# Patient Record
Sex: FEMALE | Race: WHITE | NOT HISPANIC OR LATINO | Employment: PART TIME | URBAN - METROPOLITAN AREA
[De-identification: names, ages, dates, MRNs, and addresses within clinical notes are randomized per-mention and may not be internally consistent; named-entity substitution may affect disease eponyms.]

---

## 2023-09-18 ENCOUNTER — TELEMEDICINE (OUTPATIENT)
Dept: FAMILY MEDICINE CLINIC | Facility: TELEHEALTH | Age: 43
End: 2023-09-18

## 2023-09-18 DIAGNOSIS — R09.81 COUGH WITH CONGESTION OF PARANASAL SINUS: ICD-10-CM

## 2023-09-18 DIAGNOSIS — R05.8 COUGH WITH CONGESTION OF PARANASAL SINUS: ICD-10-CM

## 2023-09-18 DIAGNOSIS — H69.83 EUSTACHIAN TUBE DYSFUNCTION, BILATERAL: Primary | ICD-10-CM

## 2023-09-18 RX ORDER — FLUTICASONE PROPIONATE 50 MCG
2 SPRAY, SUSPENSION (ML) NASAL DAILY
Qty: 16 G | Refills: 0 | Status: SHIPPED | OUTPATIENT
Start: 2023-09-18 | End: 2023-10-18

## 2023-09-18 RX ORDER — BROMPHENIRAMINE MALEATE, PSEUDOEPHEDRINE HYDROCHLORIDE, AND DEXTROMETHORPHAN HYDROBROMIDE 2; 30; 10 MG/5ML; MG/5ML; MG/5ML
5 SYRUP ORAL 4 TIMES DAILY PRN
Qty: 118 ML | Refills: 0 | Status: SHIPPED | OUTPATIENT
Start: 2023-09-18 | End: 2023-09-28

## 2023-09-18 NOTE — PROGRESS NOTES
Chief Complaint   Patient presents with    Earache    Sinusitis    Cough       Video Visit Reason:   Free Text Description: Cough, congestion, sore throat, ear ache. Completed prescription for amoxicillin last week and symptoms came back. Symptoms first started over 4 weeks ago.  Subjective   Leda Alvarez is a 43 y.o. female.     History of Present Illness  Cough, congestion and sinus congestion for about a month after treatment for a sinus infection a couple of weeks ago at Urgent Care with antibiotic and oral steroid. Symptoms improved but ears are still hurting, sinuses are still with congestion, still having drainage and cough. She is using nasal saline, no fever, chills, wheezing or shortness of air.  Earache   There is pain in both ears. The current episode started 1 to 4 weeks ago. The problem occurs constantly. The problem has been unchanged. There has been no fever. Associated symptoms include coughing. Pertinent negatives include no ear discharge.   Sinusitis  The current episode started 1 to 4 weeks ago. There has been no fever. Associated symptoms include congestion, coughing, ear pain and sinus pressure. Pertinent negatives include no chills or shortness of breath.   Cough  The cough is Productive of sputum. Associated symptoms include ear congestion, ear pain, nasal congestion and postnasal drip. Pertinent negatives include no chills, fever, shortness of breath or wheezing.     The following portions of the patient's history were reviewed and updated as appropriate: allergies, current medications, past medical history, and problem list.      History reviewed. No pertinent past medical history.  Social History     Socioeconomic History    Marital status:    Tobacco Use    Smoking status: Never    Smokeless tobacco: Never   Substance and Sexual Activity    Alcohol use: No    Drug use: Defer    Sexual activity: Defer     medication documentation: reviewed and updated with patient and   Current  Outpatient Medications:     brompheniramine-pseudoephedrine-DM 30-2-10 MG/5ML syrup, Take 5 mL by mouth 4 (Four) Times a Day As Needed for Congestion or Cough for up to 10 days., Disp: 118 mL, Rfl: 0    fluticasone (FLONASE) 50 MCG/ACT nasal spray, 2 sprays into the nostril(s) as directed by provider Daily for 30 days. Administer 2 sprays in each nostril for each dose., Disp: 16 g, Rfl: 0    IUD'S IU, by Intrauterine route., Disp: , Rfl:   Review of Systems   Constitutional:  Positive for activity change. Negative for chills and fever.   HENT:  Positive for congestion, ear pain, postnasal drip and sinus pressure. Negative for ear discharge and sinus pain.    Respiratory:  Positive for cough. Negative for shortness of breath and wheezing.      Objective   Physical Exam  Constitutional:       Appearance: Normal appearance. She is well-developed.   HENT:      Nose: Congestion present.      Right Sinus: Maxillary sinus tenderness present.      Left Sinus: Maxillary sinus tenderness present.   Eyes:      Conjunctiva/sclera: Conjunctivae normal.   Pulmonary:      Effort: Pulmonary effort is normal.   Lymphadenopathy:      Head:      Right side of head: No tonsillar, preauricular or posterior auricular adenopathy.      Left side of head: No tonsillar, preauricular or posterior auricular adenopathy.      Cervical: No cervical adenopathy (patient guided exam).   Psychiatric:         Speech: Speech normal.       Assessment & Plan   Diagnoses and all orders for this visit:    1. Eustachian tube dysfunction, bilateral (Primary)  -     fluticasone (FLONASE) 50 MCG/ACT nasal spray; 2 sprays into the nostril(s) as directed by provider Daily for 30 days. Administer 2 sprays in each nostril for each dose.  Dispense: 16 g; Refill: 0    2. Cough with congestion of paranasal sinus  -     brompheniramine-pseudoephedrine-DM 30-2-10 MG/5ML syrup; Take 5 mL by mouth 4 (Four) Times a Day As Needed for Congestion or Cough for up to 10 days.   Dispense: 118 mL; Refill: 0                    Follow Up:  If your symptoms are not resolving by the completion of your treatment or are worsening, see your primary care provider for follow up. If you don't have a primary care provider, you may go to any Urgent Care for re-evaluation. If you develop any life threatening symptoms, go to the nearest Emergency Department immediately or call EMS.               The use of  Video Visit was utilized during this visit, using both [email or web address] and [email or web address]. The use of a video visit has been reviewed with the patient and verbal informed consent has been obtained. No technical difficulties occurred during the visit.    is located at [facility]– [place][note 2][address] [facility] [address]  Provider is located at [place], [place]

## 2023-09-18 NOTE — PATIENT INSTRUCTIONS
Eustachian Tube Dysfunction    Eustachian tube dysfunction refers to a condition in which a blockage develops in the narrow passage that connects the middle ear to the back of the nose (eustachian tube). The eustachian tube regulates air pressure in the middle ear by letting air move between the ear and nose. It also helps to drain fluid from the middle ear space.  Eustachian tube dysfunction can affect one or both ears. When the eustachian tube does not function properly, air pressure, fluid, or both can build up in the middle ear.  What are the causes?  This condition occurs when the eustachian tube becomes blocked or cannot open normally. Common causes of this condition include:  Ear infections.  Colds and other infections that affect the nose, mouth, and throat (upper respiratory tract).  Allergies.  Irritation from cigarette smoke.  Irritation from stomach acid coming up into the esophagus (gastroesophageal reflux). The esophagus is the part of the body that moves food from the mouth to the stomach.  Sudden changes in air pressure, such as from descending in an airplane or scuba diving.  Abnormal growths in the nose or throat, such as:  Growths that line the nose (nasal polyps).  Abnormal growth of cells (tumors).  Enlarged tissue at the back of the throat (adenoids).  What increases the risk?  You are more likely to develop this condition if:  You smoke.  You are overweight.  You are a child who has:  Certain birth defects of the mouth, such as cleft palate.  Large tonsils or adenoids.  What are the signs or symptoms?  Common symptoms of this condition include:  A feeling of fullness in the ear.  Ear pain.  Clicking or popping noises in the ear.  Ringing in the ear (tinnitus).  Hearing loss.  Loss of balance.  Dizziness.  Symptoms may get worse when the air pressure around you changes, such as when you travel to an area of high elevation, fly on an airplane, or go scuba diving.  How is this diagnosed?  This  "condition may be diagnosed based on:  Your symptoms.  A physical exam of your ears, nose, and throat.  Tests, such as those that measure:  The movement of your eardrum.  Your hearing (audiometry).  How is this treated?  Treatment depends on the cause and severity of your condition.  In mild cases, you may relieve your symptoms by moving air into your ears. This is called \"popping the ears.\"  In more severe cases, or if you have symptoms of fluid in your ears, treatment may include:  Medicines to relieve congestion (decongestants).  Medicines that treat allergies (antihistamines).  Nasal sprays or ear drops that contain medicines that reduce swelling (steroids).  A procedure to drain the fluid in your eardrum. In this procedure, a small tube may be placed in the eardrum to:  Drain the fluid.  Restore the air in the middle ear space.  A procedure to insert a balloon device through the nose to inflate the opening of the eustachian tube (balloon dilation).  Follow these instructions at home:  Lifestyle  Do not do any of the following until your health care provider approves:  Travel to high altitudes.  Fly in airplanes.  Work in a pressurized cabin or room.  Scuba dive.  Do not use any products that contain nicotine or tobacco. These products include cigarettes, chewing tobacco, and vaping devices, such as e-cigarettes. If you need help quitting, ask your health care provider.  Keep your ears dry. Wear fitted earplugs during showering and bathing. Dry your ears completely after.  General instructions  Take over-the-counter and prescription medicines only as told by your health care provider.  Use techniques to help pop your ears as recommended by your health care provider. These may include:  Chewing gum.  Yawning.  Frequent, forceful swallowing.  Closing your mouth, holding your nose closed, and gently blowing as if you are trying to blow air out of your nose.  Keep all follow-up visits. This is important.  Contact a " health care provider if:  Your symptoms do not go away after treatment.  Your symptoms come back after treatment.  You are unable to pop your ears.  You have:  A fever.  Pain in your ear.  Pain in your head or neck.  Fluid draining from your ear.  Your hearing suddenly changes.  You become very dizzy.  You lose your balance.  Get help right away if:  You have a sudden, severe increase in any of your symptoms.  Summary  Eustachian tube dysfunction refers to a condition in which a blockage develops in the eustachian tube.  It can be caused by ear infections, allergies, inhaled irritants, or abnormal growths in the nose or throat.  Symptoms may include ear pain or fullness, hearing loss, or ringing in the ears.  Mild cases are treated with techniques to unblock the ears, such as yawning or chewing gum.  More severe cases are treated with medicines or procedures.  This information is not intended to replace advice given to you by your health care provider. Make sure you discuss any questions you have with your health care provider.  Document Revised: 02/28/2022 Document Reviewed: 02/28/2022  Elsevier Patient Education © 2023 [employer]

## 2024-08-06 NOTE — PROGRESS NOTES
"EVALUATION AND MANAGEMENT ENCOUNTER    S:  Chief Complaint   Patient presents with    Contraception       HPI:  Leda Alvarez is a 44 y.o. No obstetric history on file. with Patient's last menstrual period was 07/16/2024. here to schedule paragard removal and replacement at her next menses..    Review of Systems   Constitutional: Negative.    HENT: Negative.     Eyes: Negative.    Respiratory: Negative.     Cardiovascular: Negative.    Gastrointestinal: Negative.    Endocrine: Negative.    Musculoskeletal: Negative.    Skin: Negative.    Allergic/Immunologic: Negative.    Neurological: Negative.    Hematological: Negative.    Psychiatric/Behavioral: Negative.     :    Patient reports that she is not currently experiencing any symptoms of urinary incontinence.      noTESTED FOR CHLAMYDIA?    .CESSATIONOPT    Vital Signs: /60   Ht 154.9 cm (61\")   Wt 61.2 kg (135 lb)   LMP 07/16/2024   Breastfeeding No   BMI 25.51 kg/m²      Brief Urine Lab Results  (Last result in the past 365 days)        Color   Clarity   Blood   Leuk Est   Nitrite   Protein   CREAT   Urine HCG        08/07/24 0945 Yellow   Clear   Negative   Negative   Negative   Negative           08/07/24 0945               Negative               Physical Exam  Vitals and nursing note reviewed.   Constitutional:       Appearance: She is well-developed.   HENT:      Head: Normocephalic and atraumatic.   Cardiovascular:      Rate and Rhythm: Normal rate.   Pulmonary:      Effort: Pulmonary effort is normal.   Abdominal:      General: There is no distension.      Palpations: Abdomen is soft. There is no mass.      Tenderness: There is no abdominal tenderness. There is no guarding.   Genitourinary:     Vagina: No vaginal discharge.   Musculoskeletal:         General: No tenderness or deformity. Normal range of motion.      Cervical back: Normal range of motion.   Skin:     General: Skin is warm and dry.      Coloration: Skin is not pale.      Findings: No " erythema or rash.   Neurological:      Mental Status: She is alert and oriented to person, place, and time.   Psychiatric:         Behavior: Behavior normal.         Thought Content: Thought content normal.         Judgment: Judgment normal.             IMPRESSION:        Diagnoses and all orders for this visit:    1. Screening for genitourinary condition (Primary)  -     POC Pregnancy, Urine  -     POC Urinalysis Dipstick    2. Encounter for initial prescription of intrauterine contraceptive device (IUD)          * No active hospital problems. *          PLAN:      Return for paragard removal and replacement.     Pt instructed to call for results of any testing done today if she does not hear from us, and that failure to do so could result in inadequate treatment . Pt verbalized her understanding.     No follow-ups on file..  Instructions and precautions given.          Randy An MD  10:07 EDT   08/07/24

## 2024-08-07 ENCOUNTER — TELEPHONE (OUTPATIENT)
Dept: OBSTETRICS AND GYNECOLOGY | Facility: CLINIC | Age: 44
End: 2024-08-07

## 2024-08-07 ENCOUNTER — OFFICE VISIT (OUTPATIENT)
Dept: OBSTETRICS AND GYNECOLOGY | Facility: CLINIC | Age: 44
End: 2024-08-07
Payer: COMMERCIAL

## 2024-08-07 VITALS
BODY MASS INDEX: 25.49 KG/M2 | WEIGHT: 135 LBS | SYSTOLIC BLOOD PRESSURE: 102 MMHG | HEIGHT: 61 IN | DIASTOLIC BLOOD PRESSURE: 60 MMHG

## 2024-08-07 DIAGNOSIS — Z30.014 ENCOUNTER FOR INITIAL PRESCRIPTION OF INTRAUTERINE CONTRACEPTIVE DEVICE (IUD): ICD-10-CM

## 2024-08-07 DIAGNOSIS — Z13.89 SCREENING FOR GENITOURINARY CONDITION: Primary | ICD-10-CM

## 2024-08-07 LAB
B-HCG UR QL: NEGATIVE
BILIRUB BLD-MCNC: NEGATIVE MG/DL
CLARITY, POC: CLEAR
COLOR UR: YELLOW
EXPIRATION DATE: NORMAL
GLUCOSE UR STRIP-MCNC: NEGATIVE MG/DL
INTERNAL NEGATIVE CONTROL: NORMAL
INTERNAL POSITIVE CONTROL: NORMAL
KETONES UR QL: NEGATIVE
LEUKOCYTE EST, POC: NEGATIVE
Lab: NORMAL
NITRITE UR-MCNC: NEGATIVE MG/ML
PH UR: 5 [PH] (ref 5–8)
PROT UR STRIP-MCNC: NEGATIVE MG/DL
RBC # UR STRIP: NEGATIVE /UL
SP GR UR: 1 (ref 1–1.03)
UROBILINOGEN UR QL: NORMAL

## 2024-08-18 NOTE — PROGRESS NOTES
IUD Removal & Replacmeent Procedure Note    Chief Complaint: IUD removal & Replacement    Procedures    Type of IUD:  {IUD Device Type:21192}  Date of insertion:  {Desc; known/unknown:64681}  Reason for removal:  {IUD Removal Reason:21193}  Other relevant history/information:  {NONE DEFAULTED:22005}    Procedure Time Out Documentation      Procedure Details  IUD strings visible:  {GEN YES NO:99098}  Local anesthesia:  {Meds; anesthesia local:65231}  Tenaculum used:  {Tenaculum:21194}  Removal:  {IUD Removal Procedure:21195}    The anterior lip was grasped with a single-tooth tenaculum. The endometrial cavity was then sounded to *** cm without use of a dilator. The IUD was removed in a sterile fashion.    The  was then carefully advanced to the cervical canal into the uterus to the level of the fundus. This was then backed off about 1.5-2 cm to allow sufficient space for the arms to open. The device was deployed. The  was removed carefully from the uterus. The threads were then cut leaving 2-3 cm visible outside of the cervix.  The single-tooth tenaculum was removed from the anterior lip. Good hemostasis was noted.     All other instruments were removed from the vagina.   There were no complications.  The patient tolerated the procedure well with a minimal amount of discomfort.    The patient was counseled about the need to return in 4 weeks for string check.     She was counseled about the need to use a backup method of contraception such as condoms for 1-2 weeks. The patient is counseled to contact us if she has any significant or increasing bleeding, pain, fever, chills, or other concerns. She is instructed to see a doctor right away if she believes that she may be pregnant at any time with the IUD in place.    All appropriate instructions regarding removal were reviewed.    Tolerated well  No apparent complications  Post procedure diagnosis : IUD removal     Plans for contraception:  {PLAN  CONTRACEPTION:344819}    Other follow-up needed:  {NONE DEFAULTED:78295}    The patient was advised to call for any fever or for prolonged or severe pain or bleeding. She was advised to use {meds; pain:30443} as needed for mild to moderate pain.     There are no diagnoses linked to this encounter.    RTO No follow-ups on file.    Randy An MD    8/17/2024  22:04 EDT

## 2024-08-20 ENCOUNTER — OFFICE VISIT (OUTPATIENT)
Dept: OBSTETRICS AND GYNECOLOGY | Facility: CLINIC | Age: 44
End: 2024-08-20
Payer: COMMERCIAL

## 2024-08-20 VITALS
HEIGHT: 61 IN | SYSTOLIC BLOOD PRESSURE: 100 MMHG | BODY MASS INDEX: 25.3 KG/M2 | WEIGHT: 134 LBS | DIASTOLIC BLOOD PRESSURE: 72 MMHG

## 2024-08-20 DIAGNOSIS — T83.32XA INTRAUTERINE CONTRACEPTIVE DEVICE THREADS LOST, INITIAL ENCOUNTER: ICD-10-CM

## 2024-08-20 DIAGNOSIS — Z13.89 SCREENING FOR GENITOURINARY CONDITION: Primary | ICD-10-CM

## 2024-08-20 LAB
B-HCG UR QL: NEGATIVE
BILIRUB BLD-MCNC: NEGATIVE MG/DL
CLARITY, POC: CLEAR
COLOR UR: YELLOW
EXPIRATION DATE: NORMAL
GLUCOSE UR STRIP-MCNC: NEGATIVE MG/DL
INTERNAL NEGATIVE CONTROL: NORMAL
INTERNAL POSITIVE CONTROL: NORMAL
KETONES UR QL: ABNORMAL
LEUKOCYTE EST, POC: NEGATIVE
Lab: NORMAL
NITRITE UR-MCNC: NEGATIVE MG/ML
PH UR: 5 [PH] (ref 5–8)
PROT UR STRIP-MCNC: ABNORMAL MG/DL
RBC # UR STRIP: ABNORMAL /UL
SP GR UR: 1.03 (ref 1–1.03)
UROBILINOGEN UR QL: NORMAL

## 2024-08-20 PROCEDURE — 81025 URINE PREGNANCY TEST: CPT | Performed by: OBSTETRICS & GYNECOLOGY

## 2024-08-20 PROCEDURE — 81002 URINALYSIS NONAUTO W/O SCOPE: CPT | Performed by: OBSTETRICS & GYNECOLOGY

## 2024-08-20 PROCEDURE — 99213 OFFICE O/P EST LOW 20 MIN: CPT | Performed by: OBSTETRICS & GYNECOLOGY

## 2024-08-20 RX ORDER — SODIUM CHLORIDE 9 MG/ML
40 INJECTION, SOLUTION INTRAVENOUS AS NEEDED
OUTPATIENT
Start: 2024-08-20

## 2024-08-20 RX ORDER — COPPER 313.4 MG/1
1 INTRAUTERINE DEVICE INTRAUTERINE ONCE
COMMUNITY

## 2024-08-20 RX ORDER — DIPHENOXYLATE HYDROCHLORIDE AND ATROPINE SULFATE 2.5; .025 MG/1; MG/1
TABLET ORAL DAILY
COMMUNITY

## 2024-08-20 RX ORDER — SODIUM CHLORIDE 0.9 % (FLUSH) 0.9 %
1-10 SYRINGE (ML) INJECTION AS NEEDED
OUTPATIENT
Start: 2024-08-20

## 2024-08-20 RX ORDER — ACETAMINOPHEN 500 MG
1000 TABLET ORAL ONCE
OUTPATIENT
Start: 2024-08-20 | End: 2024-08-20

## 2024-08-20 RX ORDER — SODIUM CHLORIDE 0.9 % (FLUSH) 0.9 %
10 SYRINGE (ML) INJECTION EVERY 12 HOURS SCHEDULED
OUTPATIENT
Start: 2024-08-20

## 2024-08-20 NOTE — PROGRESS NOTES
"EVALUATION AND MANAGEMENT ENCOUNTER    S:  Chief Complaint   Patient presents with    Contraception     Paraguard removal and replacement       HPI:  Leda Alvarez is a 44 y.o. No obstetric history on file. with Patient's last menstrual period was 08/20/2024. here for paragard removal and replacement.    Review of Systems:    Patient reports that she is not currently experiencing any symptoms of urinary incontinence.      noTESTED FOR CHLAMYDIA?    .CESSATIONOPT    Vital Signs: /72   Ht 154.9 cm (61\")   Wt 60.8 kg (134 lb)   LMP 08/20/2024   BMI 25.32 kg/m²    Flowsheet Rows      Flowsheet Row First Filed Value   Admission Height 154.9 cm (61\") Documented at 08/20/2024 1456   Admission Weight 60.8 kg (134 lb) Documented at 08/20/2024 1456            Brief Urine Lab Results  (Last result in the past 365 days)        Color   Clarity   Blood   Leuk Est   Nitrite   Protein   CREAT   Urine HCG        08/20/24 1505               Negative               Physical Exam  Vitals and nursing note reviewed.   Constitutional:       Appearance: She is well-developed.   HENT:      Head: Normocephalic and atraumatic.   Cardiovascular:      Rate and Rhythm: Normal rate.   Pulmonary:      Effort: Pulmonary effort is normal.   Abdominal:      General: There is no distension.      Palpations: Abdomen is soft. There is no mass.      Tenderness: There is no abdominal tenderness. There is no guarding.   Genitourinary:     Vagina: No vaginal discharge.   Musculoskeletal:         General: No tenderness or deformity. Normal range of motion.      Cervical back: Normal range of motion.   Skin:     General: Skin is warm and dry.      Coloration: Skin is not pale.      Findings: No erythema or rash.   Neurological:      Mental Status: She is alert and oriented to person, place, and time.   Psychiatric:         Behavior: Behavior normal.         Thought Content: Thought content normal.         Judgment: Judgment normal.       String not " seen at os    U/S:  IUD IN PLACE IN FUNDUS.       IMPRESSION:      Diagnoses and all orders for this visit:    1. Screening for genitourinary condition (Primary)  -     POC Urinalysis Dipstick  -     POC Pregnancy, Urine    2. Intrauterine contraceptive device threads lost, initial encounter  -     Case Request; Standing  -     CBC and Differential; Future  -     sodium chloride 0.9 % flush 10 mL  -     sodium chloride 0.9 % flush 1-10 mL  -     sodium chloride 0.9 % infusion 40 mL  -     acetaminophen (TYLENOL) tablet 1,000 mg  -     Case Request    Other orders  -     Code Status and Medical Interventions: CPR (Attempt to Resuscitate); Full Support; Standing  -     Follow Anesthesia Guidelines / Protocol; Future  -     Follow Anesthesia Guidelines / Protocol; Standing  -     Chlorhexidine Skin Prep; Future  -     Obtain informed consent; Standing  -     Verify / Perform Chlorhexidine Skin Prep; Standing  -     Insert Peripheral IV; Standing  -     Saline Lock & Maintain IV Access; Standing  -     Place Sequential Compression Device; Standing  -     Maintain Sequential Compression Device; Standing          * No active hospital problems. *          PLAN:      Dx hysteroscopy, intrauterine device removal and insertion    Pt instructed to call for results of any testing done today if she does not hear from us, and that failure to do so could result in inadequate treatment . Pt verbalized her understanding.     Return in about 4 weeks (around 9/17/2024) for postop check..  Instructions and precautions given.         Randy An MD  15:30 EDT   08/20/24

## 2024-08-21 ENCOUNTER — ANESTHESIA EVENT (OUTPATIENT)
Dept: PERIOP | Facility: HOSPITAL | Age: 44
End: 2024-08-21
Payer: COMMERCIAL

## 2024-08-22 ENCOUNTER — ANESTHESIA (OUTPATIENT)
Dept: PERIOP | Facility: HOSPITAL | Age: 44
End: 2024-08-22
Payer: COMMERCIAL

## 2024-09-04 NOTE — H&P
PREOPERATIVE HISTORY AND PHYSICAL      Patient Care Team:  Lenin Howard MD as PCP - General (Family Medicine)  [place], Randy De La Rosa MD as Consulting Physician (Obstetrics and Gynecology)    Chief complaint: IUD threads lost, pt     Pt is a 44 y.o. No obstetric history on file.  Patient's last menstrual period was 08/20/2024.     HPI:History of Present Illness    PMHx: History reviewed. No pertinent past medical history.    Current problem list:    IUD threads lost    Encounter for initial prescription of intrauterine contraceptive device (IUD)       PSHx: History reviewed. No pertinent surgical history.    Social Hx:   Social History     Socioeconomic History    Marital status:    Tobacco Use    Smoking status: Never    Smokeless tobacco: Never   Vaping Use    Vaping status: Never Used   Substance and Sexual Activity    Alcohol use: No    Drug use: Never    Sexual activity: Defer       FHx: History reviewed. No pertinent family history.    Debilities/Disabilities Identified: None    Emotional Behavior: Appropriate    PGyn Hx:  otherwise noncontributory    POBHx:   OB History   No obstetric history on file.       Allergies: Patient has no known allergies.    Medications:   No medications prior to admission.                            No current facility-administered medications for this encounter.    Current Outpatient Medications:     multivitamin (MULTIVITAMIN PO), Take  by mouth Daily., Disp: , Rfl:     Paragard Intrauterine Copper intrauterine device IUD, To be inserted one time by prescriber. Route intrauterine., Disp: , Rfl:     SEMAGLUTIDE-WEIGHT MANAGEMENT SC, Inject 0.15 mg under the skin into the appropriate area as directed 1 (One) Time Per Week. Saturdays, Disp: , Rfl:         Review of Systems   Constitutional: Negative.    HENT: Negative.     Eyes: Negative.    Respiratory: Negative.     Cardiovascular: Negative.    Gastrointestinal: Negative.    Endocrine: Negative.     Musculoskeletal: Negative.    Skin: Negative.    Allergic/Immunologic: Negative.    Neurological: Negative.    Hematological: Negative.    Psychiatric/Behavioral: Negative.         Vital Signs  LMP 08/20/2024     Physical Exam  Vitals and nursing note reviewed. Exam conducted with a chaperone present.   Constitutional:       General: She is not in acute distress.     Appearance: She is well-developed. She is not diaphoretic.   HENT:      Head: Normocephalic and atraumatic.      Nose: Nose normal.   Eyes:      Extraocular Movements: Extraocular movements intact.   Cardiovascular:      Rate and Rhythm: Normal rate.   Pulmonary:      Effort: Pulmonary effort is normal.   Chest:   Breasts:     Breasts are symmetrical.      Right: Normal. No mass, nipple discharge, skin change or tenderness.      Left: Normal. No mass, nipple discharge, skin change or tenderness.   Abdominal:      General: There is no distension.      Palpations: Abdomen is soft. There is no mass.      Tenderness: There is no abdominal tenderness. There is no guarding.   Genitourinary:     General: Normal vulva.      Pubic Area: No rash.       Vagina: Normal. No vaginal discharge.      Cervix: Normal.      Uterus: Normal.       Adnexa: Right adnexa normal and left adnexa normal.   Musculoskeletal:         General: No tenderness or deformity. Normal range of motion.      Cervical back: Normal range of motion.   Lymphadenopathy:      Upper Body:      Right upper body: No axillary adenopathy.      Left upper body: No axillary adenopathy.   Skin:     General: Skin is warm and dry.      Coloration: Skin is not pale.      Findings: No erythema or rash.   Neurological:      Mental Status: She is alert and oriented to person, place, and time.   Psychiatric:         Behavior: Behavior normal.         Thought Content: Thought content normal.         Judgment: Judgment normal.             IMPRESSION:    IUD threads lost                                    PLAN:     Procedure(s):  INTRAUTERINE DEVICE REMOVAL  AND REPLACEMENT,  DIAGNOSTIC HYSTEROSCOPY    RISKS, ALTERNATIVES, COMPLICATIONS OF THE PROCEDURE INCLUDING BUT NOT LIMITED TO:    INTRAOPERATIVE RISKS: INJURY TO INTERNAL AND ADJACENT ORGANS AND STRUCTURES (BOWEL, BLADDER, URETER,BLOOD VESSELS) OR HEMORRHAGE REQUIRING FURTHER SURGERY (LAPAROTOMY),  POSSIBLE NON-DIAGNOSTIC FINDINGS, DISCOVERY OF POSSIBLE MALIGNANCY, INFECTION, AND DEATH;   POSTOP COMPLICATIONS: BLEEDING, INFECTION (REQUIRING POSSIBLE REOPERATION), FAILURE OF GOAL OF SURGERY AND RECURRENCE OF ORIGINAL SYMPTOMS, PNEUMONIA, PULMONARY EMBOLISM, AND DEATH;  WERE EXPLAINED TO THE PT WHO VERBALIZED HER UNDERSTANDING.             I discussed the patients findings and my recommendations with patient and family.     Randy An MD  09/03/24  20:51 EDT

## 2024-09-05 ENCOUNTER — CLINICAL SUPPORT (OUTPATIENT)
Dept: OBSTETRICS AND GYNECOLOGY | Facility: CLINIC | Age: 44
End: 2024-09-05
Payer: COMMERCIAL

## 2024-09-05 ENCOUNTER — HOSPITAL ENCOUNTER (OUTPATIENT)
Facility: HOSPITAL | Age: 44
Setting detail: HOSPITAL OUTPATIENT SURGERY
Discharge: HOME | End: 2024-09-05
Attending: OBSTETRICS & GYNECOLOGY | Admitting: NURSE ANESTHETIST, CERTIFIED REGISTERED
Payer: COMMERCIAL

## 2024-09-05 VITALS
WEIGHT: 131 LBS | OXYGEN SATURATION: 100 % | BODY MASS INDEX: 24.73 KG/M2 | HEART RATE: 64 BPM | TEMPERATURE: 98 F | SYSTOLIC BLOOD PRESSURE: 97 MMHG | DIASTOLIC BLOOD PRESSURE: 70 MMHG | RESPIRATION RATE: 14 BRPM | HEIGHT: 61 IN

## 2024-09-05 DIAGNOSIS — Z30.430 ENCOUNTER FOR IUD INSERTION: Primary | ICD-10-CM

## 2024-09-05 DIAGNOSIS — T83.32XA INTRAUTERINE CONTRACEPTIVE DEVICE THREADS LOST, INITIAL ENCOUNTER: ICD-10-CM

## 2024-09-05 PROBLEM — Z30.433 ENCOUNTER FOR IUD REMOVAL AND REINSERTION: Status: ACTIVE | Noted: 2024-09-05

## 2024-09-05 LAB — HCG SERPL QL: NEGATIVE

## 2024-09-05 PROCEDURE — 25010000002 FENTANYL CITRATE (PF) 50 MCG/ML SOLUTION: Performed by: NURSE ANESTHETIST, CERTIFIED REGISTERED

## 2024-09-05 PROCEDURE — 84703 CHORIONIC GONADOTROPIN ASSAY: CPT | Performed by: NURSE ANESTHETIST, CERTIFIED REGISTERED

## 2024-09-05 PROCEDURE — 85025 COMPLETE CBC W/AUTO DIFF WBC: CPT | Performed by: OBSTETRICS & GYNECOLOGY

## 2024-09-05 PROCEDURE — C1889 IMPLANT/INSERT DEVICE, NOC: HCPCS | Performed by: OBSTETRICS & GYNECOLOGY

## 2024-09-05 PROCEDURE — 25010000002 MIDAZOLAM PER 1MG: Performed by: NURSE ANESTHETIST, CERTIFIED REGISTERED

## 2024-09-05 PROCEDURE — 25810000003 LACTATED RINGERS PER 1000 ML: Performed by: NURSE ANESTHETIST, CERTIFIED REGISTERED

## 2024-09-05 PROCEDURE — 25010000002 DEXAMETHASONE PER 1 MG: Performed by: NURSE ANESTHETIST, CERTIFIED REGISTERED

## 2024-09-05 PROCEDURE — 25010000002 ONDANSETRON PER 1 MG: Performed by: NURSE ANESTHETIST, CERTIFIED REGISTERED

## 2024-09-05 PROCEDURE — 25010000002 PROPOFOL 200 MG/20ML EMULSION: Performed by: NURSE ANESTHETIST, CERTIFIED REGISTERED

## 2024-09-05 DEVICE — IUD PARAGARD T 380A: Type: IMPLANTABLE DEVICE | Site: VAGINA | Status: FUNCTIONAL

## 2024-09-05 RX ORDER — ONDANSETRON 2 MG/ML
4 INJECTION INTRAMUSCULAR; INTRAVENOUS ONCE AS NEEDED
Status: COMPLETED | OUTPATIENT
Start: 2024-09-05 | End: 2024-09-05

## 2024-09-05 RX ORDER — SODIUM CHLORIDE, SODIUM LACTATE, POTASSIUM CHLORIDE, CALCIUM CHLORIDE 600; 310; 30; 20 MG/100ML; MG/100ML; MG/100ML; MG/100ML
9 INJECTION, SOLUTION INTRAVENOUS CONTINUOUS PRN
Status: DISCONTINUED | OUTPATIENT
Start: 2024-09-05 | End: 2024-09-05 | Stop reason: HOSPADM

## 2024-09-05 RX ORDER — SODIUM CHLORIDE 0.9 % (FLUSH) 0.9 %
10 SYRINGE (ML) INJECTION EVERY 12 HOURS SCHEDULED
Status: DISCONTINUED | OUTPATIENT
Start: 2024-09-05 | End: 2024-09-05 | Stop reason: HOSPADM

## 2024-09-05 RX ORDER — SODIUM CHLORIDE 0.9 % (FLUSH) 0.9 %
1-10 SYRINGE (ML) INJECTION AS NEEDED
Status: DISCONTINUED | OUTPATIENT
Start: 2024-09-05 | End: 2024-09-05 | Stop reason: HOSPADM

## 2024-09-05 RX ORDER — DEXMEDETOMIDINE HYDROCHLORIDE 100 UG/ML
INJECTION, SOLUTION INTRAVENOUS AS NEEDED
Status: DISCONTINUED | OUTPATIENT
Start: 2024-09-05 | End: 2024-09-05 | Stop reason: SURG

## 2024-09-05 RX ORDER — ONDANSETRON 2 MG/ML
4 INJECTION INTRAMUSCULAR; INTRAVENOUS ONCE AS NEEDED
Status: DISCONTINUED | OUTPATIENT
Start: 2024-09-05 | End: 2024-09-05 | Stop reason: HOSPADM

## 2024-09-05 RX ORDER — FAMOTIDINE 10 MG/ML
20 INJECTION, SOLUTION INTRAVENOUS
Status: COMPLETED | OUTPATIENT
Start: 2024-09-05 | End: 2024-09-05

## 2024-09-05 RX ORDER — SODIUM CHLORIDE, SODIUM LACTATE, POTASSIUM CHLORIDE, CALCIUM CHLORIDE 600; 310; 30; 20 MG/100ML; MG/100ML; MG/100ML; MG/100ML
100 INJECTION, SOLUTION INTRAVENOUS ONCE
Status: DISCONTINUED | OUTPATIENT
Start: 2024-09-05 | End: 2024-09-05 | Stop reason: HOSPADM

## 2024-09-05 RX ORDER — MIDAZOLAM HYDROCHLORIDE 2 MG/2ML
1 INJECTION, SOLUTION INTRAMUSCULAR; INTRAVENOUS
Status: DISCONTINUED | OUTPATIENT
Start: 2024-09-05 | End: 2024-09-05 | Stop reason: HOSPADM

## 2024-09-05 RX ORDER — HYDROCODONE BITARTRATE AND ACETAMINOPHEN 7.5; 325 MG/1; MG/1
1 TABLET ORAL ONCE AS NEEDED
Status: DISCONTINUED | OUTPATIENT
Start: 2024-09-05 | End: 2024-09-05 | Stop reason: HOSPADM

## 2024-09-05 RX ORDER — ACETAMINOPHEN 500 MG
1000 TABLET ORAL ONCE
Status: COMPLETED | OUTPATIENT
Start: 2024-09-05 | End: 2024-09-05

## 2024-09-05 RX ORDER — FENTANYL CITRATE 50 UG/ML
INJECTION, SOLUTION INTRAMUSCULAR; INTRAVENOUS AS NEEDED
Status: DISCONTINUED | OUTPATIENT
Start: 2024-09-05 | End: 2024-09-05 | Stop reason: SURG

## 2024-09-05 RX ORDER — SODIUM CHLORIDE 0.9 % (FLUSH) 0.9 %
10 SYRINGE (ML) INJECTION AS NEEDED
Status: DISCONTINUED | OUTPATIENT
Start: 2024-09-05 | End: 2024-09-05 | Stop reason: HOSPADM

## 2024-09-05 RX ORDER — PROPOFOL 10 MG/ML
INJECTION, EMULSION INTRAVENOUS CONTINUOUS PRN
Status: DISCONTINUED | OUTPATIENT
Start: 2024-09-05 | End: 2024-09-05 | Stop reason: SURG

## 2024-09-05 RX ORDER — LIDOCAINE HYDROCHLORIDE 10 MG/ML
0.5 INJECTION, SOLUTION INFILTRATION; PERINEURAL ONCE AS NEEDED
Status: DISCONTINUED | OUTPATIENT
Start: 2024-09-05 | End: 2024-09-05 | Stop reason: HOSPADM

## 2024-09-05 RX ORDER — SODIUM CHLORIDE 9 MG/ML
40 INJECTION, SOLUTION INTRAVENOUS AS NEEDED
Status: DISCONTINUED | OUTPATIENT
Start: 2024-09-05 | End: 2024-09-05 | Stop reason: HOSPADM

## 2024-09-05 RX ORDER — DEXAMETHASONE SODIUM PHOSPHATE 10 MG/ML
8 INJECTION INTRAMUSCULAR; INTRAVENOUS ONCE AS NEEDED
Status: COMPLETED | OUTPATIENT
Start: 2024-09-05 | End: 2024-09-05

## 2024-09-05 RX ORDER — LIDOCAINE HYDROCHLORIDE 20 MG/ML
INJECTION, SOLUTION INFILTRATION; PERINEURAL AS NEEDED
Status: DISCONTINUED | OUTPATIENT
Start: 2024-09-05 | End: 2024-09-05 | Stop reason: SURG

## 2024-09-05 RX ADMIN — FAMOTIDINE 20 MG: 10 INJECTION, SOLUTION INTRAVENOUS at 08:09

## 2024-09-05 RX ADMIN — SODIUM CHLORIDE, POTASSIUM CHLORIDE, SODIUM LACTATE AND CALCIUM CHLORIDE 9 ML/HR: 600; 310; 30; 20 INJECTION, SOLUTION INTRAVENOUS at 08:10

## 2024-09-05 RX ADMIN — LIDOCAINE HYDROCHLORIDE 50 MG: 20 INJECTION, SOLUTION INFILTRATION; PERINEURAL at 09:09

## 2024-09-05 RX ADMIN — DEXMEDETOMIDINE 6 MCG: 100 INJECTION, SOLUTION INTRAVENOUS at 09:09

## 2024-09-05 RX ADMIN — PROPOFOL 150 MCG/KG/MIN: 10 INJECTION, EMULSION INTRAVENOUS at 09:11

## 2024-09-05 RX ADMIN — ONDANSETRON 4 MG: 2 INJECTION INTRAMUSCULAR; INTRAVENOUS at 08:09

## 2024-09-05 RX ADMIN — DEXAMETHASONE SODIUM PHOSPHATE 8 MG: 10 INJECTION INTRAMUSCULAR; INTRAVENOUS at 08:09

## 2024-09-05 RX ADMIN — COPPER 1 EACH: 313.4 INTRAUTERINE DEVICE INTRAUTERINE at 00:00

## 2024-09-05 RX ADMIN — FENTANYL CITRATE 50 MCG: 50 INJECTION INTRAMUSCULAR; INTRAVENOUS at 09:09

## 2024-09-05 RX ADMIN — MIDAZOLAM HYDROCHLORIDE 1 MG: 1 INJECTION, SOLUTION INTRAMUSCULAR; INTRAVENOUS at 09:00

## 2024-09-05 RX ADMIN — ACETAMINOPHEN 1000 MG: 500 TABLET ORAL at 08:10

## 2024-09-05 RX ADMIN — DEXMEDETOMIDINE 4 MCG: 100 INJECTION, SOLUTION INTRAVENOUS at 09:16

## 2024-09-05 NOTE — ANESTHESIA PREPROCEDURE EVALUATION
Anesthesia Evaluation     Patient summary reviewed and Nursing notes reviewed   no history of anesthetic complications:   NPO Solid Status: > 8 hours  NPO Liquid Status: > 8 hours           Airway   Mallampati: II  TM distance: >3 FB  Neck ROM: full  Possible difficult intubation and No difficulty expected  Dental - normal exam     Pulmonary - negative pulmonary ROS and normal exam    breath sounds clear to auscultation  Cardiovascular - normal exam  Exercise tolerance: good (4-7 METS)    Rhythm: regular  Rate: normal        Neuro/Psych  GI/Hepatic/Renal/Endo - negative ROS     Musculoskeletal (-) negative ROS    Abdominal  - normal exam   Substance History - negative use     OB/GYN negative ob/gyn ROS         Other                    Anesthesia Plan    ASA 1     MAC     intravenous induction     Anesthetic plan, risks, benefits, and alternatives have been provided, discussed and informed consent has been obtained with: patient.  Pre-procedure education provided  Use of blood products discussed with patient  Consented to blood products.      CODE STATUS:    Level Of Support Discussed With: Patient  Code Status (Patient has no pulse and is not breathing): CPR (Attempt to Resuscitate)  Medical Interventions (Patient has pulse or is breathing): Full Support

## 2024-09-05 NOTE — INTERVAL H&P NOTE
"H&P reviewed. The patient was examined and there are no changes to the H&P.    Temp 98 °F (36.7 °C) (Oral)   Ht 154.9 cm (61\")   Wt 59.4 kg (131 lb)   LMP 08/20/2024   BMI 24.75 kg/m²     Medications Prior to Admission   Medication Sig Dispense Refill Last Dose    multivitamin (MULTIVITAMIN PO) Take  by mouth Daily.       Paragard Intrauterine Copper intrauterine device IUD To be inserted one time by prescriber. Route intrauterine.       SEMAGLUTIDE-WEIGHT MANAGEMENT SC Inject 0.15 mg under the skin into the appropriate area as directed 1 (One) Time Per Week. Saturdays        "

## 2024-09-05 NOTE — ANESTHESIA POSTPROCEDURE EVALUATION
Patient: Leda Alvarez    Procedure Summary       Date: 09/05/24 Room / Location: [facility] OR 1 /  [facility] OR    Anesthesia Start: 0904 Anesthesia Stop: 0947    Procedure: INTRAUTERINE DEVICE REMOVAL  AND REPLACEMENT,  DIAGNOSTIC HYSTEROSCOPY (Vagina) Diagnosis:       Intrauterine contraceptive device threads lost, initial encounter      (Intrauterine contraceptive device threads lost, initial encounter [T83.32XA])    Surgeons: Randy An MD Provider: Caridad Argueta CRNA    Anesthesia Type: MAC ASA Status: 1            Anesthesia Type: MAC    Vitals  Vitals Value Taken Time   BP 82/57 09/05/24 1000   Temp     Pulse 61 09/05/24 1003   Resp 14 09/05/24 0950   SpO2 100 % 09/05/24 1003   Vitals shown include unfiled device data.        Post Anesthesia Care and Evaluation    Patient location during evaluation: PHASE II  Patient participation: complete - patient participated  Level of consciousness: awake and alert  Pain management: adequate    Airway patency: patent  Anesthetic complications: No anesthetic complications  PONV Status: none  Cardiovascular status: acceptable  Respiratory status: acceptable  Hydration status: acceptable

## 2024-09-05 NOTE — OP NOTE
DATE OF PROCEDURE:  09/05/24    PROCEDURE:  INTRAUTERINE DEVICE REMOVAL WITH HYSTEROSCOPY,  PARAGARD INTRAUTERINE DEVICE INSERTION    SURGEON:  Randy An MD    ASSISTANT:  none    PREOP DX:  lost IUD strings, desires replacement paragard    POSTOP DX:  same    ANESTHESIA:  MAC    EBL:  2CC    URINE OUTPUT:  0CC    IVFS:  500CC    FINDINGS:  IUD in uterine cavity with very short strings    COMPLICATIONS:  none    SPECIMENS:  none    ANTIBIOTICS:            DESCRIPTION OF THE PROCEDURE:    After informed consent was obtained, the pt was taken to the OR and MAC was induced without difficulty.  Pt was then placed in the dorsolithotomy position.    Pt was then prepped and draped in the usual fashion for the procedure.    An open graves speculum was placed and the anterior lip of the cervix was grasped and the uterus was sounded to 8 cms.  The IUD strings were not visualized and could not be grasped with the aid of a IUD hook or uterine dressing forceps.     The hysteroscope was then assembled and using sterile saline to visualize, the IUD strings were seen, grasped, and the paragard IUD removed intact and handed off.    The new/replacement Paragard IUD was then inserted per protocol without complications and the string cut to 4cms.    All sponge, instrument and needle counts were correct x 3 according to the OR personnel.    Pt tolerated procedure well and went to the recovery room in satisfactory condition.  There was no evidence of uterine perforation or cervical laceration.     Randy An MD  09:40 EDT  09/05/24

## 2024-09-26 RX ORDER — COPPER 313.4 MG/1
INTRAUTERINE DEVICE INTRAUTERINE ONCE
Status: COMPLETED | OUTPATIENT
Start: 2024-09-05 | End: 2024-09-05

## 2024-10-04 PROBLEM — Z30.014 ENCOUNTER FOR INITIAL PRESCRIPTION OF INTRAUTERINE CONTRACEPTIVE DEVICE (IUD): Status: RESOLVED | Noted: 2024-08-07 | Resolved: 2024-10-04

## 2024-10-04 PROBLEM — T83.32XA IUD THREADS LOST: Status: RESOLVED | Noted: 2024-08-20 | Resolved: 2024-10-04

## 2024-10-05 NOTE — PROGRESS NOTES
"STRING CHECK FROM POSTOP REMOVAL, HYSTEROSCOPY AND REPLACEMENT OF PARAGARD    S:  doing well, no major complaints.  Has had a period    O:  /68   Ht 154.9 cm (61\")   Wt 58.5 kg (129 lb)   BMI 24.37 kg/m²     Exam:  string seen at os.      A:  doing well, correct placement    P:  RTO 1 yr.    Randy An MD  09:21 EDT  10/08/24        Answers submitted by the patient for this visit:  Other (Submitted on 10/8/2024)  Please describe your symptoms.: Iud string check  Have you had these symptoms before?: Yes  How long have you been having these symptoms?: Greater than 2 weeks  Primary Reason for Visit (Submitted on 10/8/2024)  What is the primary reason for your visit?: Problem Not Listed    "

## 2024-10-08 ENCOUNTER — OFFICE VISIT (OUTPATIENT)
Dept: OBSTETRICS AND GYNECOLOGY | Facility: CLINIC | Age: 44
End: 2024-10-08
Payer: COMMERCIAL

## 2024-10-08 VITALS
DIASTOLIC BLOOD PRESSURE: 68 MMHG | BODY MASS INDEX: 24.35 KG/M2 | WEIGHT: 129 LBS | SYSTOLIC BLOOD PRESSURE: 102 MMHG | HEIGHT: 61 IN

## 2024-10-08 DIAGNOSIS — Z30.433 ENCOUNTER FOR IUD REMOVAL AND REINSERTION: Primary | ICD-10-CM

## 2024-10-08 DIAGNOSIS — Z30.431 IUD CHECK UP: ICD-10-CM

## 2024-10-08 LAB
B-HCG UR QL: NEGATIVE
BILIRUB BLD-MCNC: NEGATIVE MG/DL
CLARITY, POC: CLEAR
COLOR UR: YELLOW
EXPIRATION DATE: NORMAL
GLUCOSE UR STRIP-MCNC: NEGATIVE MG/DL
INTERNAL NEGATIVE CONTROL: NORMAL
INTERNAL POSITIVE CONTROL: NORMAL
KETONES UR QL: NEGATIVE
LEUKOCYTE EST, POC: NEGATIVE
Lab: NORMAL
NITRITE UR-MCNC: NEGATIVE MG/ML
PH UR: 5 [PH] (ref 5–8)
PROT UR STRIP-MCNC: NEGATIVE MG/DL
RBC # UR STRIP: NEGATIVE /UL
SP GR UR: 1.02 (ref 1–1.03)
UROBILINOGEN UR QL: NORMAL

## (undated) DEVICE — TUBING, SUCTION, 1/4" X 20', STRAIGHT: Brand: MEDLINE INDUSTRIES, INC.

## (undated) DEVICE — GLV SURG SENSICARE W/ALOE PF LF 7.5 STRL

## (undated) DEVICE — 1000ML,PRESSURE INFUSER W/STOPCOCK: Brand: MEDLINE

## (undated) DEVICE — LAG PERI GYN: Brand: MEDLINE INDUSTRIES, INC.

## (undated) DEVICE — CP SEAL HYSTEROSCOPE OMNI 5F STRL

## (undated) DEVICE — CYSTO/BLADDER IRRIGATION SET, REGULATING CLAMP

## (undated) DEVICE — CURETTE ENDOMTRL SXN

## (undated) DEVICE — SEAL HYSTERSCOPE/OUTFLOW CHANNEL MYOSURE

## (undated) DEVICE — SOL IRR H2O BTL 1000ML STRL

## (undated) DEVICE — GOWN,PREVENTION PLUS,XLNG/XXLARGE,STRL: Brand: MEDLINE